# Patient Record
Sex: FEMALE | Race: WHITE | NOT HISPANIC OR LATINO | URBAN - METROPOLITAN AREA
[De-identification: names, ages, dates, MRNs, and addresses within clinical notes are randomized per-mention and may not be internally consistent; named-entity substitution may affect disease eponyms.]

---

## 2019-08-24 NOTE — TELEPHONE ENCOUNTER
RECORDS RECEIVED FROM: [facility]   DATE RECEIVED: 8/24/19   NOTES STATUS DETAILS   OFFICE NOTE from referring provider N/A    OFFICE NOTE from other specialist Care Everywhere 3/19/19*5/8/19*7/2/19*   DISCHARGE SUMMARY from hospital N/A    DISCHARGE REPORT from the ER N/A    OPERATIVE REPORT N/A    MEDICATION LIST Care Everywhere    IMPLANT RECORD/STICKER N/A    LABS     CBC/DIFF N/A    CULTURES N/A    INJECTIONS DONE IN RADIOLOGY N/A    MRI In process 12/15/16*   CT SCAN N/A    XRAYS (IMAGES & REPORTS) In process 12/15/17*   TUMOR     PATHOLOGY  Slides & report N/A      09/12/19   3:36 PM   Called [facility] again, they were adamant they pushed the records. I asked them to push them again, they said they would, however the images are still not in PACS.  Will let staff know.  Renuka Sabillon CMA    09/11/19   3:56 PM   Spoke to [facility] who will push images  Renuka Sabillon CMA    09/09/19 SE  2:11 PM  Faxed request to [facility] for images.

## 2019-09-13 ENCOUNTER — PRE VISIT (OUTPATIENT)
Dept: ORTHOPEDICS | Facility: CLINIC | Age: 24
End: 2019-09-13